# Patient Record
Sex: FEMALE | Race: BLACK OR AFRICAN AMERICAN | NOT HISPANIC OR LATINO | URBAN - METROPOLITAN AREA
[De-identification: names, ages, dates, MRNs, and addresses within clinical notes are randomized per-mention and may not be internally consistent; named-entity substitution may affect disease eponyms.]

---

## 2021-07-16 ENCOUNTER — OFFICE VISIT (OUTPATIENT)
Dept: URBAN - METROPOLITAN AREA CLINIC 27 | Facility: CLINIC | Age: 71
End: 2021-07-16

## 2021-07-16 PROBLEM — 275978004 SCREENING FOR MALIGNANT NEOPLASM OF COLON: Status: ACTIVE | Noted: 2021-07-16

## 2021-07-16 PROBLEM — 266435005 GASTRO-ESOPHAGEAL REFLUX DISEASE WITHOUT ESOPHAGITIS: Status: ACTIVE | Noted: 2021-07-16

## 2021-07-16 PROBLEM — 414916001 OBESITY: Status: ACTIVE | Noted: 2021-07-16

## 2021-07-17 PROBLEM — 449681000124101 LONG-TERM CURRENT USE OF ANTIPLATELET DRUG: Status: ACTIVE | Noted: 2021-07-17

## 2021-08-02 ENCOUNTER — OFFICE VISIT (OUTPATIENT)
Dept: URBAN - METROPOLITAN AREA SURGERY CENTER 7 | Facility: SURGERY CENTER | Age: 71
End: 2021-08-02

## 2022-04-30 ENCOUNTER — TELEPHONE ENCOUNTER (OUTPATIENT)
Dept: URBAN - METROPOLITAN AREA CLINIC 121 | Facility: CLINIC | Age: 72
End: 2022-04-30

## 2022-04-30 RX ORDER — POLYETHYLENE GLYCOL-3350 AND ELECTROLYTES 236; 6.74; 5.86; 2.97; 22.74 G/274.31G; G/274.31G; G/274.31G; G/274.31G; G/274.31G
MIX AND USE AS DIRECTED POWDER, FOR SOLUTION ORAL
OUTPATIENT
Start: 2021-07-19 | End: 2021-08-02

## 2022-04-30 RX ORDER — ASPIRIN 325 MG
TABLET ORAL
OUTPATIENT
Start: 2016-02-01

## 2022-04-30 RX ORDER — CYCLOBENZAPRINE HYDROCHLORIDE 10 MG/1
TABLET, FILM COATED ORAL
OUTPATIENT
Start: 2016-02-01

## 2022-04-30 RX ORDER — QUINAPRIL HYDROCHLORIDE AND HYDROCHLOROTHIAZIDE 20; 12.5 MG/1; MG/1
TABLET, FILM COATED ORAL
OUTPATIENT
Start: 2016-02-01

## 2022-04-30 RX ORDER — POLYETHYLENE GLYCOL-3350 AND ELECTROLYTES 236; 6.74; 5.86; 2.97; 22.74 G/274.31G; G/274.31G; G/274.31G; G/274.31G; G/274.31G
MIX AND USE AS DIRECTED POWDER, FOR SOLUTION ORAL
OUTPATIENT
Start: 2021-07-19

## 2022-04-30 RX ORDER — DICLOFENAC SODIUM 50 MG/1
TABLET, DELAYED RELEASE ORAL
OUTPATIENT
Start: 2016-02-01 | End: 2021-07-16

## 2022-04-30 RX ORDER — LEVOTHYROXINE SODIUM 150 MCG
TABLET ORAL
OUTPATIENT
Start: 2016-02-01

## 2022-04-30 RX ORDER — HYDROCODONE BITARTRATE AND ACETAMINOPHEN 5; 325 MG/1; MG/1
TABLET ORAL
OUTPATIENT
Start: 2016-02-01

## 2022-04-30 RX ORDER — DICLOFENAC SODIUM 50 MG/1
TABLET, DELAYED RELEASE ORAL
OUTPATIENT
Start: 2016-02-01

## 2022-05-01 ENCOUNTER — TELEPHONE ENCOUNTER (OUTPATIENT)
Dept: URBAN - METROPOLITAN AREA CLINIC 121 | Facility: CLINIC | Age: 72
End: 2022-05-01

## 2022-05-01 RX ORDER — HYDROCORTISONE ACETATE 25 MG/1
USE AS DIRECTED BID SUPPOSITORY RECTAL
Status: ACTIVE | COMMUNITY
Start: 2016-02-17

## 2022-05-01 RX ORDER — ASPIRIN 325 MG
TABLET ORAL
Status: ACTIVE | COMMUNITY
Start: 2016-02-01

## 2022-05-01 RX ORDER — CYCLOBENZAPRINE HYDROCHLORIDE 10 MG/1
TABLET, FILM COATED ORAL
Status: ACTIVE | COMMUNITY
Start: 2016-02-01

## 2022-05-01 RX ORDER — QUINAPRIL HYDROCHLORIDE AND HYDROCHLOROTHIAZIDE 20; 12.5 MG/1; MG/1
TABLET, FILM COATED ORAL
Status: ACTIVE | COMMUNITY
Start: 2016-02-01

## 2022-05-01 RX ORDER — LEVOTHYROXINE SODIUM 150 MCG
TABLET ORAL
Status: ACTIVE | COMMUNITY
Start: 2016-02-01

## 2022-05-01 RX ORDER — HYDROCODONE BITARTRATE AND ACETAMINOPHEN 5; 325 MG/1; MG/1
TABLET ORAL
Status: ACTIVE | COMMUNITY
Start: 2016-02-01

## 2024-06-05 ENCOUNTER — OFFICE VISIT (OUTPATIENT)
Dept: URBAN - METROPOLITAN AREA CLINIC 27 | Facility: CLINIC | Age: 74
End: 2024-06-05
Payer: MEDICARE

## 2024-06-05 ENCOUNTER — DASHBOARD ENCOUNTERS (OUTPATIENT)
Age: 74
End: 2024-06-05

## 2024-06-05 VITALS
WEIGHT: 199 LBS | DIASTOLIC BLOOD PRESSURE: 98 MMHG | BODY MASS INDEX: 35.26 KG/M2 | SYSTOLIC BLOOD PRESSURE: 132 MMHG | HEIGHT: 63 IN | HEART RATE: 92 BPM

## 2024-06-05 DIAGNOSIS — R19.7 ACUTE DIARRHEA: ICD-10-CM

## 2024-06-05 DIAGNOSIS — Z86.010 HISTORY OF COLON POLYPS: ICD-10-CM

## 2024-06-05 PROBLEM — 428283002: Status: ACTIVE | Noted: 2024-06-05

## 2024-06-05 PROCEDURE — 99204 OFFICE O/P NEW MOD 45 MIN: CPT | Performed by: PHYSICIAN ASSISTANT

## 2024-06-05 PROCEDURE — 99244 OFF/OP CNSLTJ NEW/EST MOD 40: CPT | Performed by: PHYSICIAN ASSISTANT

## 2024-06-05 RX ORDER — HYDROCORTISONE ACETATE 25 MG/1
USE AS DIRECTED BID SUPPOSITORY RECTAL
Status: ACTIVE | COMMUNITY
Start: 2016-02-17

## 2024-06-05 RX ORDER — HYDROCODONE BITARTRATE AND ACETAMINOPHEN 5; 325 MG/1; MG/1
TABLET ORAL
Status: ACTIVE | COMMUNITY
Start: 2016-02-01

## 2024-06-05 RX ORDER — LEVOTHYROXINE SODIUM 150 MCG
TABLET ORAL
Status: ACTIVE | COMMUNITY
Start: 2016-02-01

## 2024-06-05 RX ORDER — QUINAPRIL AND HYDROCHLOROTHIAZIDE 12.5; 2 MG/1; MG/1
TABLET ORAL
Status: ACTIVE | COMMUNITY
Start: 2016-02-01

## 2024-06-05 RX ORDER — CYCLOBENZAPRINE HYDROCHLORIDE 10 MG/1
TABLET, FILM COATED ORAL
Status: ACTIVE | COMMUNITY
Start: 2016-02-01

## 2024-06-05 RX ORDER — ASPIRIN 325 MG
TABLET ORAL
Status: ACTIVE | COMMUNITY
Start: 2016-02-01

## 2024-06-05 NOTE — HPI-TODAY'S VISIT:
Ms. Burger is a 73-year-old female seen at the request of Dr. Martínez for surveillance of colon polyps. A copy of this document will be sent to the referring physician. Her sister had colon cancer. Her last colonoscopy in 2021 was significant for 2 (6 mm) polyps in the ascending colon. On ROS, she reports increased stool urgency over the past 2 weeks. Her baseline stool frequency is 1 formed stool qd - qod. Now having 3-5 postprandial stools per day with severe urgency. She denies new medication, foreign travel or sick contacts. She took a 10 day course of abx 6 weeks ago.

## 2024-06-12 ENCOUNTER — TELEPHONE ENCOUNTER (OUTPATIENT)
Dept: URBAN - METROPOLITAN AREA CLINIC 27 | Facility: CLINIC | Age: 74
End: 2024-06-12

## 2024-06-12 LAB — CLOSTRIDIUM DIFFICILE TOXINB,QL REAL TIME PCR: DETECTED

## 2024-06-12 RX ORDER — FIDAXOMICIN 200 MG/1
1 TABLET TABLET, FILM COATED ORAL TWICE A DAY
Qty: 20 | Refills: 0 | OUTPATIENT
Start: 2024-06-12 | End: 2024-06-22

## 2024-06-13 ENCOUNTER — TELEPHONE ENCOUNTER (OUTPATIENT)
Dept: URBAN - METROPOLITAN AREA CLINIC 27 | Facility: CLINIC | Age: 74
End: 2024-06-13

## 2024-07-19 ENCOUNTER — OFFICE VISIT (OUTPATIENT)
Dept: URBAN - METROPOLITAN AREA SURGERY CENTER 7 | Facility: SURGERY CENTER | Age: 74
End: 2024-07-19
Payer: MEDICARE

## 2024-07-19 ENCOUNTER — CLAIMS CREATED FROM THE CLAIM WINDOW (OUTPATIENT)
Dept: URBAN - METROPOLITAN AREA CLINIC 4 | Facility: CLINIC | Age: 74
End: 2024-07-19
Payer: MEDICARE

## 2024-07-19 DIAGNOSIS — Z86.010 ADENOMAS PERSONAL HISTORY OF COLONIC POLYPS: ICD-10-CM

## 2024-07-19 DIAGNOSIS — Z86.010 PERSONAL HISTORY OF COLON POLYPS: ICD-10-CM

## 2024-07-19 DIAGNOSIS — K64.8 HEMORRHOIDS, INTERNAL. NON-BLEEDING: ICD-10-CM

## 2024-07-19 DIAGNOSIS — Z80.0 BROTHER AT YOUNG AGE FAMILY HISTORY OF COLON CANCER: ICD-10-CM

## 2024-07-19 DIAGNOSIS — K63.89 OTHER SPECIFIED DISEASES OF INTESTINE: ICD-10-CM

## 2024-07-19 DIAGNOSIS — K57.30 DIVERTICULAR DISEASE OF LEFT COLON: ICD-10-CM

## 2024-07-19 DIAGNOSIS — D12.3 ADENOMATOUS POLYP OF TRANSVERSE COLON: ICD-10-CM

## 2024-07-19 DIAGNOSIS — R19.7 ACUTE DIARRHEA: ICD-10-CM

## 2024-07-19 PROCEDURE — 45380 COLONOSCOPY AND BIOPSY: CPT | Performed by: CLINIC/CENTER

## 2024-07-19 PROCEDURE — 45380 COLONOSCOPY AND BIOPSY: CPT | Performed by: INTERNAL MEDICINE

## 2024-07-19 PROCEDURE — 88305 TISSUE EXAM BY PATHOLOGIST: CPT | Performed by: PATHOLOGY

## 2024-07-19 PROCEDURE — 00812 ANES LWR INTST SCR COLSC: CPT | Performed by: NURSE ANESTHETIST, CERTIFIED REGISTERED

## 2024-07-19 RX ORDER — QUINAPRIL AND HYDROCHLOROTHIAZIDE 12.5; 2 MG/1; MG/1
TABLET ORAL
Status: ACTIVE | COMMUNITY
Start: 2016-02-01

## 2024-07-19 RX ORDER — HYDROCORTISONE ACETATE 25 MG/1
USE AS DIRECTED BID SUPPOSITORY RECTAL
Status: ACTIVE | COMMUNITY
Start: 2016-02-17

## 2024-07-19 RX ORDER — ASPIRIN 325 MG
TABLET ORAL
Status: ACTIVE | COMMUNITY
Start: 2016-02-01

## 2024-07-19 RX ORDER — CYCLOBENZAPRINE HYDROCHLORIDE 10 MG/1
TABLET, FILM COATED ORAL
Status: ACTIVE | COMMUNITY
Start: 2016-02-01

## 2024-07-19 RX ORDER — LEVOTHYROXINE SODIUM 150 MCG
TABLET ORAL
Status: ACTIVE | COMMUNITY
Start: 2016-02-01

## 2024-07-19 RX ORDER — HYDROCODONE BITARTRATE AND ACETAMINOPHEN 5; 325 MG/1; MG/1
TABLET ORAL
Status: ACTIVE | COMMUNITY
Start: 2016-02-01